# Patient Record
Sex: MALE | Race: WHITE
[De-identification: names, ages, dates, MRNs, and addresses within clinical notes are randomized per-mention and may not be internally consistent; named-entity substitution may affect disease eponyms.]

---

## 2021-10-09 ENCOUNTER — HOSPITAL ENCOUNTER (EMERGENCY)
Dept: HOSPITAL 75 - ER | Age: 26
Discharge: HOME | End: 2021-10-09
Payer: SELF-PAY

## 2021-10-09 VITALS — HEIGHT: 74.8 IN | BODY MASS INDEX: 28.25 KG/M2 | WEIGHT: 224.87 LBS

## 2021-10-09 VITALS — SYSTOLIC BLOOD PRESSURE: 140 MMHG | DIASTOLIC BLOOD PRESSURE: 89 MMHG
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
## 2021-10-09 DIAGNOSIS — S52.042A: Primary | ICD-10-CM

## 2021-10-09 PROCEDURE — 73080 X-RAY EXAM OF ELBOW: CPT

## 2021-10-09 PROCEDURE — 99283 EMERGENCY DEPT VISIT LOW MDM: CPT

## 2021-10-09 NOTE — ED UPPER EXTREMITY
General


Chief Complaint:  Upper Extremity


Stated Complaint:  WAS TREATED FOR L ARM DISLOCATION AND FX/NEW INJ


Source:  patient


Exam Limitations:  no limitations





History of Present Illness


Date Seen by Provider:  Oct 9, 2021


Time Seen by Provider:  20:03


Initial Comments


To ER accompanied by an older female possibly mom or grandmother with reports of

left arm injury.  He sustained a dislocation of the left elbow 3 weeks ago in 

[facility].  He was treated in the emergency room with reduction and was 

also found to have a fracture of the bottom part of his humerus he states.  He 

has not yet seen orthopedics but is scheduled to do so on Tuesday of this coming

week.  Tonight he bumped the elbow on something and now it hurts worse.


Onset:  just prior to arrival


Severity:  moderate


Pain/Injury Location:  left elbow


Method of Injury:  direct blow


Modifying Factors:  Worse With Movement





Allergies and Home Medications


Allergies


Coded Allergies:  


     No Known Drug Allergies (Unverified , 1/14/12)





Patient Home Medication List


Home Medication List Reviewed:  Yes





Review of Systems


Constitutional:  see HPI


EENTM:  see HPI


Respiratory:  no symptoms reported


Cardiovascular:  no symptoms reported


Genitourinary:  no symptoms reported


Musculoskeletal:  no symptoms reported


Skin:  no symptoms reported


Psychiatric/Neurological:  No Symptoms Reported





Physical Exam


Vital Signs





Vital Signs - First Documented








 10/9/21





 20:02


 


Temp 37.1


 


Pulse 100


 


Resp 20


 


B/P (MAP) 143/90 (107)


 


Pulse Ox 97


 


O2 Delivery Room Air





Capillary Refill :


Height, Weight, BMI


Height: '"


Weight: lbs. oz. kg;  BMI


Method:


General Appearance:  WD/WN, no apparent distress


HEENT:  PERRL/EOMI, normal ENT inspection


Respiratory:  no respiratory distress, no accessory muscle use


Shoulder:  normal inspection, non-tender


Elbow/Forearm:  Left, limited ROM, pain


Wrist:  Yes normal inspection, Yes non-tender


Hand:  normal inspection, non-tender


Neurologic/Psychiatric:  alert, normal mood/affect, oriented x 3


Skin:  normal color, warm/dry





Progress/Results/Core Measures


Results/Orders


My Orders





Orders - CORBY LONGO


Elbow, Left, 3 Views (10/9/21 20:00)


Rx-Hydrocodone/Apap 5-325 Mg (Rx-Vicodin (10/9/21 20:00)





Medications Given in ED





Current Medications








 Medications  Dose


 Ordered  Sig/Leatha


 Route  Start Time


 Stop Time Status Last Admin


Dose Admin


 


 Acetaminophen/


 Hydrocodone Bitart  1 ea  Q4H  PRN


 PO  10/9/21 20:00


    10/9/21 20:08


1 EA








Vital Signs/I&O











 10/9/21





 20:02


 


Temp 37.1


 


Pulse 100


 


Resp 20


 


B/P (MAP) 143/90 (107)


 


Pulse Ox 97


 


O2 Delivery Room Air











Departure


Impression





   Primary Impression:  


   Elbow fracture, left


Disposition:  01 HOME, SELF-CARE


Condition:  Stable





Departure-Patient Inst.


Decision time for Depature:  20:23


Referrals:  


[facility] (PCP/Family)


Primary Care Physician


Patient Instructions:  Elbow Fracture (DC)





Add. Discharge Instructions:  


Wear your sling at all times.  Return to ER for any worsening.





All discharge instructions reviewed with patient and/or family. Voiced 

understanding.  Warm


Scripts


Hydrocodone/Acetaminophen (Hydrocodone-Acetamin 5-325 mg) 1 Each Tablet


1 TAB PO Q4H PRN for PAIN-MODERATE (5-7), #14 TAB


   Prov: CORBY LONGO         10/9/21











CORBY LONGO              Oct 9, 2021 20:05

## 2021-10-09 NOTE — DIAGNOSTIC IMAGING REPORT
INDICATION: Pain.



FINDINGS: There is a minimally displaced fracture of the coronoid

process. There is no other fracture or dislocation. Soft tissues

are unremarkable.



IMPRESSION: Minimally displaced fracture of the coronoid process

otherwise unremarkable. 



Dictated by: 



  Dictated on workstation # [id number]